# Patient Record
Sex: MALE | Race: BLACK OR AFRICAN AMERICAN | NOT HISPANIC OR LATINO | Employment: PART TIME | URBAN - METROPOLITAN AREA
[De-identification: names, ages, dates, MRNs, and addresses within clinical notes are randomized per-mention and may not be internally consistent; named-entity substitution may affect disease eponyms.]

---

## 2017-08-07 ENCOUNTER — HOSPITAL ENCOUNTER (EMERGENCY)
Facility: HOSPITAL | Age: 27
Discharge: HOME | End: 2017-08-07
Attending: EMERGENCY MEDICINE
Payer: MEDICAID

## 2017-08-07 VITALS
RESPIRATION RATE: 17 BRPM | SYSTOLIC BLOOD PRESSURE: 139 MMHG | HEIGHT: 74 IN | HEART RATE: 83 BPM | DIASTOLIC BLOOD PRESSURE: 92 MMHG | BODY MASS INDEX: 20.53 KG/M2 | WEIGHT: 160 LBS | TEMPERATURE: 99 F | OXYGEN SATURATION: 100 %

## 2017-08-07 DIAGNOSIS — R10.9 LEFT FLANK PAIN: ICD-10-CM

## 2017-08-07 DIAGNOSIS — M54.9 ACUTE LEFT-SIDED BACK PAIN, UNSPECIFIED BACK LOCATION: Primary | ICD-10-CM

## 2017-08-07 LAB
BILIRUB UR QL STRIP: NEGATIVE
CLARITY UR: CLEAR
COLOR UR: YELLOW
GLUCOSE UR QL STRIP: NEGATIVE
HGB UR QL STRIP: NEGATIVE
KETONES UR QL STRIP: NEGATIVE
LEUKOCYTE ESTERASE UR QL STRIP: NEGATIVE
NITRITE UR QL STRIP: NEGATIVE
PH UR STRIP: 6 [PH] (ref 5–8)
PROT UR QL STRIP: NEGATIVE
SP GR UR STRIP: 1.02 (ref 1–1.03)
URN SPEC COLLECT METH UR: NORMAL
UROBILINOGEN UR STRIP-ACNC: NEGATIVE EU/DL

## 2017-08-07 PROCEDURE — 99284 EMERGENCY DEPT VISIT MOD MDM: CPT

## 2017-08-07 PROCEDURE — 25000003 PHARM REV CODE 250: Performed by: NURSE PRACTITIONER

## 2017-08-07 PROCEDURE — 81003 URINALYSIS AUTO W/O SCOPE: CPT

## 2017-08-07 RX ORDER — HYDROCODONE BITARTRATE AND ACETAMINOPHEN 10; 325 MG/1; MG/1
1 TABLET ORAL
Status: COMPLETED | OUTPATIENT
Start: 2017-08-07 | End: 2017-08-07

## 2017-08-07 RX ORDER — METHOCARBAMOL 500 MG/1
1000 TABLET, FILM COATED ORAL 3 TIMES DAILY PRN
Qty: 18 TABLET | Refills: 0 | Status: SHIPPED | OUTPATIENT
Start: 2017-08-07

## 2017-08-07 RX ORDER — NAPROXEN 500 MG/1
500 TABLET ORAL 2 TIMES DAILY PRN
Qty: 10 TABLET | Refills: 0 | Status: SHIPPED | OUTPATIENT
Start: 2017-08-07 | End: 2017-08-07 | Stop reason: ALTCHOICE

## 2017-08-07 RX ORDER — METHOCARBAMOL 500 MG/1
1000 TABLET, FILM COATED ORAL
Status: COMPLETED | OUTPATIENT
Start: 2017-08-07 | End: 2017-08-07

## 2017-08-07 RX ORDER — ETODOLAC 300 MG/1
300 CAPSULE ORAL EVERY 8 HOURS PRN
Qty: 15 CAPSULE | Refills: 0 | Status: SHIPPED | OUTPATIENT
Start: 2017-08-07

## 2017-08-07 RX ADMIN — METHOCARBAMOL 1000 MG: 500 TABLET ORAL at 07:08

## 2017-08-07 RX ADMIN — HYDROCODONE BITARTRATE AND ACETAMINOPHEN 1 TABLET: 10; 325 TABLET ORAL at 07:08

## 2017-08-07 NOTE — ED PROVIDER NOTES
"Encounter Date: 8/7/2017    SCRIBE #1 NOTE: I, Nneka Jeronimo, am scribing for, and in the presence of,  AYEDN Andino. I have scribed the following portions of the note - Other sections scribed: HPI and ROS.   SCRIBE #2 NOTE: I, Yvette Ermias, am scribing for, and in the presence of,  AYDEN Andino. I have scribed the following portions of the note - Other sections scribed: HPI and ROS.     History     Chief Complaint   Patient presents with    Back Pain     lower back spasms, more left side since yesterday while playing video games     CC: Back Pain    HPI: The pt is a 26 y.o. M with a PMHx of hypertension who presents to the ED c/o acute, constant left lower back pain that began yesterday night. Pt rates the pain as severe (10/10) and describes it as a "sharp, spreading" pain. Pt reports that he was sitting and playing video games yesterday night when he experienced "back spasms" upon standing. Pt states that he has not had any recent back injuries or done any heavy lifting. Pt attempted treatment with heat application. No alleviating factors. Pt otherwise denies fever, nausea, emesis, dysuria, and hematuria. He also denies hx of kidney stones.       The history is provided by the patient. No  was used.     Review of patient's allergies indicates:  No Known Allergies  Past Medical History:   Diagnosis Date    Hypertension      History reviewed. No pertinent surgical history.  No family history on file.  Social History   Substance Use Topics    Smoking status: Former Smoker    Smokeless tobacco: Never Used    Alcohol use No      Comment: occasionally     Review of Systems   Constitutional: Negative for chills, diaphoresis and fever.   HENT: Negative for ear pain and sore throat.    Eyes: Negative for redness.   Respiratory: Negative for cough and shortness of breath.    Cardiovascular: Negative for chest pain.   Gastrointestinal: Negative for abdominal pain, diarrhea, nausea and vomiting. "   Genitourinary: Negative for dysuria and hematuria.   Musculoskeletal: Positive for back pain (lower left).   Skin: Negative for rash.   Neurological: Negative for headaches.       Physical Exam     Initial Vitals [08/07/17 1754]   BP Pulse Resp Temp SpO2   (!) 155/98 100 20 98.8 °F (37.1 °C) 97 %      MAP       117         Physical Exam    Nursing note and vitals reviewed.  Constitutional: He appears well-developed and well-nourished. He is not diaphoretic. He is cooperative.  Non-toxic appearance. No distress.   HENT:   Head: Normocephalic and atraumatic.   Right Ear: External ear normal.   Left Ear: External ear normal.   Mouth/Throat: Oropharynx is clear and moist.   Eyes: Conjunctivae and EOM are normal.   Neck: Normal range of motion.   Cardiovascular: Normal rate, regular rhythm and intact distal pulses.   Pulses:       Radial pulses are 2+ on the right side, and 2+ on the left side.   Pulmonary/Chest: Breath sounds normal. No respiratory distress.   Abdominal: Soft. He exhibits no distension. There is no tenderness.   Musculoskeletal:        Cervical back: Normal.        Thoracic back: He exhibits pain (left). He exhibits no tenderness and no bony tenderness.        Lumbar back: He exhibits pain (left). He exhibits no tenderness and no bony tenderness.        Back:    No midline cervical, thoracic, lumbar tenderness palpation.  No bruising, erythema, open wounds or increased warmth over the back.   Neurological: He is alert and oriented to person, place, and time. He has normal strength. No sensory deficit. Coordination and gait normal. GCS eye subscore is 4. GCS verbal subscore is 5. GCS motor subscore is 6.   Skin: Skin is warm and dry. Capillary refill takes less than 2 seconds. No rash noted.   Psychiatric: He has a normal mood and affect. His behavior is normal. Judgment and thought content normal.         ED Course   Procedures  Labs Reviewed   URINALYSIS             Medical Decision Making:    Clinical Tests:   Radiological Study: Ordered and Reviewed       APC / Resident Notes:   This is an evaluation of a 26-year-old male that presents emergency with complaints of left flank and back pain from the mid thoracic back down to the mid lumbar back since yesterday.  He describes the pain as sharp, stabbing, and intermittent.  Denies fever, rash, night sweats, weight loss, dysuria, bowel/bladder incontinence, or IV\SQ drug use. The patient is a non-toxic, afebrile, and well however uncomfortable appearing male. On physical exam, there is Full range of motion without pain, no tenderness, no spasm, no curvature. No midline TTP. No TTP over the left flank. There is no abdominal pain, CVA tenderness, saddle anesthesia. Strength and sensation are symmetric bilaterally.   There is no history of previous back injury. There is no bony cervical, thoracic, or lumbar tenderness palpation.  There is no erythema, bruising, or increased warmth over the back or abdomen.  His abdomen is soft and nontender with no rebound, guarding, or masses. Vital signs reassuring. RESULTS: CT renal stone with no hydronephrosis.  No forming or obstructive renal stones.  No inflammatory changes the bowel.  No inflammatory changes surrounding the appendix.  Osseous structures appear intact.    Given the above findings, my overall impression is Back Pain. Given the above findings, I do not think the patient has acute vertebral fracture, subluxation, dislocation, pyelonephritis, kidney stone sciatica, epidural abscess, cauda equina, shingles, UTI.    ED Course: Norco, Robaxin. D/C Meds: Robaxin, Lodine (requested something stronger than Naproxen 2/2 it not helping in the past). The diagnosis, treatment plan, instructions for follow-up and reevaluation with his PCP as well as ED return precautions were discussed and understanding was verbalized. All questions or concerns have been addressed. This case was discussed with Dr. Bahena who is in  agreement with my assessment and plan. LUKE Arevalo, RANDAL        Scribe Attestation:   Scribe #1: I performed the above scribed service and the documentation accurately describes the services I performed. I attest to the accuracy of the note.  Scribe #2: I performed the above scribed service and the documentation accurately describes the services I performed. I attest to the accuracy of the note.    Attending Attestation:           Physician Attestation for Scribe:  Physician Attestation Statement for Scribe #1: I, AYDEN Andino, reviewed documentation, as scribed by Nneka Jeronimo in my presence, and it is both accurate and complete.   Physician Attestation Statement for Scribe #2: I, AYDEN Andino, reviewed documentation, as scribed by Yvette Monson in my presence, and it is both accurate and complete. I also acknowledge and confirm the content of the note done by Scribe #1.              ED Course     Clinical Impression:   The primary encounter diagnosis was Acute left-sided back pain, unspecified back location. A diagnosis of Left flank pain was also pertinent to this visit.    Disposition:   Disposition: Discharged  Condition: Stable                        AYDEN Cordero  08/07/17 4595

## 2017-08-07 NOTE — ED TRIAGE NOTES
Back Pain: Patient presents for presents evaluation of low back problems.  Symptoms have been present for 2 days and include pain in lower left side (sharp, shooting, throbbing and tight band in character; 10/10 in severity) and stiffness in lower left side. Initial inciting event: none. Symptoms are worst: morning, afternoon, nighttime. Alleviating factors identifiable by patient are none. Exacerbating factors identifiable by patient are bending forwards, sitting, standing and walking. Treatments so far initiated by patient: none Previous lower back problems: none. Previous workup: none. Previous treatments: none.

## 2017-08-08 NOTE — DISCHARGE INSTRUCTIONS
Please return to the Emergency Department for any new or worsening symptoms including: Worsening back pain, worsening side pain, fever, chest pain, shortness of breath, loss of consciousness, dizziness, weakness, or any other concerns.     Please follow up with your Primary Care Provider within in the week. If you do not have one, you may contact the one listed on your discharge paperwork or you may also call the [facility] Appointment Desk at [phone] to schedule an appointment with one.     Please take all medication as prescribed. You have been prescribed Robaxin for pain. Please do not take this medication while working, drinking alcohol, swimming, or while driving/operating heavy machinery. This medication may cause drowsiness, impair judgment, and reduce physical capabilities.    You have been prescribed Naproxen for pain. This is an Non-Steroidal Anti-Inflammatory (NSAID) Medication. Please do not take any additional NSAIDs while you are taking this medication including (Advil, Aleve, Motrin, Ibuprofen, Mobic\meloxicam, Naprosyn, etc.). Please stop taking this medication if you experience: weakness, itching, yellow skin or eyes, joint pains, vomiting blood, blood or black stools, unusual weight gain, or swelling in your arms, legs, hands, or feet.